# Patient Record
Sex: FEMALE | Race: WHITE | NOT HISPANIC OR LATINO | Employment: UNEMPLOYED | URBAN - METROPOLITAN AREA
[De-identification: names, ages, dates, MRNs, and addresses within clinical notes are randomized per-mention and may not be internally consistent; named-entity substitution may affect disease eponyms.]

---

## 2024-01-23 RX ORDER — ASPIRIN 325 MG
50000 TABLET, DELAYED RELEASE (ENTERIC COATED) ORAL
COMMUNITY

## 2024-01-23 RX ORDER — MULTIVITAMIN
1 TABLET ORAL DAILY
COMMUNITY

## 2024-01-23 RX ORDER — LISINOPRIL AND HYDROCHLOROTHIAZIDE 10; 12.5 MG/1; MG/1
1 TABLET ORAL DAILY
COMMUNITY
End: 2024-01-26 | Stop reason: SDUPTHER

## 2024-01-26 ENCOUNTER — OFFICE VISIT (OUTPATIENT)
Dept: PRIMARY CARE | Facility: CLINIC | Age: 64
End: 2024-01-26
Payer: COMMERCIAL

## 2024-01-26 VITALS
DIASTOLIC BLOOD PRESSURE: 81 MMHG | SYSTOLIC BLOOD PRESSURE: 119 MMHG | HEIGHT: 63 IN | HEART RATE: 56 BPM | BODY MASS INDEX: 29.48 KG/M2 | TEMPERATURE: 97.9 F | WEIGHT: 166.4 LBS

## 2024-01-26 DIAGNOSIS — Z12.31 ENCOUNTER FOR SCREENING MAMMOGRAM FOR MALIGNANT NEOPLASM OF BREAST: ICD-10-CM

## 2024-01-26 DIAGNOSIS — F51.05 INSOMNIA SECONDARY TO ANXIETY: ICD-10-CM

## 2024-01-26 DIAGNOSIS — I10 ESSENTIAL HYPERTENSION: Primary | ICD-10-CM

## 2024-01-26 DIAGNOSIS — F41.9 INSOMNIA SECONDARY TO ANXIETY: ICD-10-CM

## 2024-01-26 PROBLEM — J30.9 ALLERGIC RHINITIS: Status: ACTIVE | Noted: 2024-01-26

## 2024-01-26 PROBLEM — I83.90 VARICOSE VEIN OF LEG: Status: ACTIVE | Noted: 2024-01-26

## 2024-01-26 PROBLEM — E78.1 HIGH TRIGLYCERIDES: Status: ACTIVE | Noted: 2024-01-26

## 2024-01-26 PROBLEM — E55.9 VITAMIN D DEFICIENCY: Status: ACTIVE | Noted: 2024-01-26

## 2024-01-26 PROCEDURE — 3079F DIAST BP 80-89 MM HG: CPT | Performed by: FAMILY MEDICINE

## 2024-01-26 PROCEDURE — 3074F SYST BP LT 130 MM HG: CPT | Performed by: FAMILY MEDICINE

## 2024-01-26 PROCEDURE — 99213 OFFICE O/P EST LOW 20 MIN: CPT | Performed by: FAMILY MEDICINE

## 2024-01-26 PROCEDURE — 1036F TOBACCO NON-USER: CPT | Performed by: FAMILY MEDICINE

## 2024-01-26 RX ORDER — HYDROXYZINE HYDROCHLORIDE 10 MG/1
10-20 TABLET, FILM COATED ORAL NIGHTLY PRN
Qty: 15 TABLET | Refills: 0 | Status: SHIPPED | OUTPATIENT
Start: 2024-01-26 | End: 2024-04-30

## 2024-01-26 RX ORDER — LISINOPRIL AND HYDROCHLOROTHIAZIDE 10; 12.5 MG/1; MG/1
1 TABLET ORAL DAILY
Qty: 90 TABLET | Refills: 1 | Status: SHIPPED | OUTPATIENT
Start: 2024-01-26

## 2024-01-26 ASSESSMENT — PATIENT HEALTH QUESTIONNAIRE - PHQ9
SUM OF ALL RESPONSES TO PHQ9 QUESTIONS 1 AND 2: 0
2. FEELING DOWN, DEPRESSED OR HOPELESS: NOT AT ALL
1. LITTLE INTEREST OR PLEASURE IN DOING THINGS: NOT AT ALL

## 2024-01-26 NOTE — PROGRESS NOTES
"Subjective   Patient ID: Yenni Toussaint is a 63 y.o. female who presents for Follow-up (6 month follow up).    HPI   Insomnia-problems for past week. 30-60 min+ for sleep onset.  Waking up 1-2 times.  Occasional difficulty with returning to sleep. Increased stressors  at home and work.  Htn-sbp<120,dbp<80  Review of Systems    Objective   /81 (BP Location: Left arm, Patient Position: Sitting, BP Cuff Size: Large adult)   Pulse 56   Temp 36.6 °C (97.9 °F)   Ht 1.6 m (5' 3\")   Wt 75.5 kg (166 lb 6.4 oz)   BMI 29.48 kg/m²     Physical Exam  Constitutional:       General: She is not in acute distress.     Appearance: Normal appearance.   HENT:      Head: Normocephalic and atraumatic.      Right Ear: Tympanic membrane and ear canal normal.      Left Ear: Tympanic membrane and ear canal normal.      Nose: Nose normal. No congestion or rhinorrhea.      Mouth/Throat:      Mouth: Mucous membranes are moist.      Pharynx: Oropharynx is clear.   Eyes:      Extraocular Movements: Extraocular movements intact.      Conjunctiva/sclera: Conjunctivae normal.      Pupils: Pupils are equal, round, and reactive to light.   Cardiovascular:      Rate and Rhythm: Normal rate and regular rhythm.      Pulses: Normal pulses.      Heart sounds: Normal heart sounds. No murmur heard.  Pulmonary:      Effort: Pulmonary effort is normal. No respiratory distress.      Breath sounds: Normal breath sounds.   Chest:   Breasts:     Right: Normal. No mass or skin change.      Left: Normal. No mass or skin change.   Abdominal:      General: Abdomen is flat. Bowel sounds are normal.      Palpations: Abdomen is soft.      Tenderness: There is no abdominal tenderness.   Musculoskeletal:         General: Normal range of motion.      Cervical back: Normal range of motion and neck supple.   Lymphadenopathy:      Cervical: No cervical adenopathy.      Upper Body:      Right upper body: No axillary adenopathy.      Left upper body: No axillary " adenopathy.   Skin:     General: Skin is warm and dry.   Neurological:      General: No focal deficit present.      Mental Status: She is alert and oriented to person, place, and time.   Psychiatric:         Mood and Affect: Mood normal.         Thought Content: Thought content normal.         Assessment/Plan   Diagnoses and all orders for this visit:  Essential hypertension  -     lisinopriL-hydrochlorothiazide 10-12.5 mg tablet; Take 1 tablet by mouth once daily.  Encounter for screening mammogram for malignant neoplasm of breast  -     BI mammo bilateral screening tomosynthesis; Future  Insomnia secondary to anxiety  Trial of low-dose hydroxyzine at bedtime to see if this helps with sleep onset and reduction of anxiety at bedtime.  -     hydrOXYzine HCL (Atarax) 10 mg tablet; Take 1-2 tablets (10-20 mg) by mouth as needed at bedtime for itching.

## 2024-02-16 ENCOUNTER — TELEPHONE (OUTPATIENT)
Dept: PRIMARY CARE | Facility: CLINIC | Age: 64
End: 2024-02-16
Payer: COMMERCIAL

## 2024-02-16 DIAGNOSIS — R00.1 BRADYCARDIA: Primary | ICD-10-CM

## 2024-02-16 NOTE — TELEPHONE ENCOUNTER
Patient LVM that she was seen end of last month and her HR was low.  She has been monitoring it.  She states that it stays around 50.  She says that it does increase when she exerts herself but then goes back down.  Please advise.

## 2024-02-16 NOTE — TELEPHONE ENCOUNTER
Recommend 24-hour Holter monitor for better evaluation of heart rate throughout 1 day.  Order in chart.

## 2024-02-23 ENCOUNTER — ANCILLARY PROCEDURE (OUTPATIENT)
Dept: CARDIOLOGY | Facility: CLINIC | Age: 64
End: 2024-02-23
Payer: COMMERCIAL

## 2024-02-23 DIAGNOSIS — R00.1 BRADYCARDIA: ICD-10-CM

## 2024-02-23 PROCEDURE — 93227 XTRNL ECG REC<48 HR R&I: CPT | Performed by: INTERNAL MEDICINE

## 2024-02-23 PROCEDURE — 93225 XTRNL ECG REC<48 HRS REC: CPT | Performed by: INTERNAL MEDICINE

## 2024-03-12 ENCOUNTER — TELEPHONE (OUTPATIENT)
Dept: CARDIOLOGY | Facility: CLINIC | Age: 64
End: 2024-03-12
Payer: COMMERCIAL

## 2024-04-30 ENCOUNTER — OFFICE VISIT (OUTPATIENT)
Dept: PRIMARY CARE | Facility: CLINIC | Age: 64
End: 2024-04-30
Payer: COMMERCIAL

## 2024-04-30 VITALS
SYSTOLIC BLOOD PRESSURE: 137 MMHG | TEMPERATURE: 98.1 F | WEIGHT: 167 LBS | DIASTOLIC BLOOD PRESSURE: 88 MMHG | HEART RATE: 57 BPM | HEIGHT: 63 IN | BODY MASS INDEX: 29.59 KG/M2

## 2024-04-30 DIAGNOSIS — K59.00 CONSTIPATION, UNSPECIFIED CONSTIPATION TYPE: ICD-10-CM

## 2024-04-30 DIAGNOSIS — E66.3 OVERWEIGHT WITH BODY MASS INDEX (BMI) OF 29 TO 29.9 IN ADULT: ICD-10-CM

## 2024-04-30 DIAGNOSIS — E78.1 HIGH TRIGLYCERIDES: ICD-10-CM

## 2024-04-30 DIAGNOSIS — I10 ESSENTIAL HYPERTENSION: Primary | ICD-10-CM

## 2024-04-30 PROCEDURE — 3079F DIAST BP 80-89 MM HG: CPT | Performed by: INTERNAL MEDICINE

## 2024-04-30 PROCEDURE — 1036F TOBACCO NON-USER: CPT | Performed by: INTERNAL MEDICINE

## 2024-04-30 PROCEDURE — 99215 OFFICE O/P EST HI 40 MIN: CPT | Performed by: INTERNAL MEDICINE

## 2024-04-30 PROCEDURE — 3075F SYST BP GE 130 - 139MM HG: CPT | Performed by: INTERNAL MEDICINE

## 2024-04-30 PROCEDURE — 3008F BODY MASS INDEX DOCD: CPT | Performed by: INTERNAL MEDICINE

## 2024-04-30 ASSESSMENT — PATIENT HEALTH QUESTIONNAIRE - PHQ9
SUM OF ALL RESPONSES TO PHQ9 QUESTIONS 1 AND 2: 0
1. LITTLE INTEREST OR PLEASURE IN DOING THINGS: NOT AT ALL
2. FEELING DOWN, DEPRESSED OR HOPELESS: NOT AT ALL

## 2024-04-30 NOTE — PROGRESS NOTES
Subjective   Patient ID: Yenni Toussaint is a 64 y.o. female who presents for Constipation (Patient c/o constipation for the last 2 weeks. Patient states she took a laxative a week and a half ago. ).    HPI Pt is a pleasant 64 y.o. CF who was seen and evaluated during the OV with 2 weeks hx of constipation. Pt states that her BM usually is on the daily basis. However, for the last 2 weeks she feels like she did not empty her bowel completely, Pt had to use OTC laxative such as dulcolax and had a large BW earlier today. During the OV pt denies fever, chills, no change of appetite, no abdominal pian. No N/V/D, no blood in stool. Pt had the screening colonoscopy back to 2017 and was asked to repeat a colonoscopy in 10 years. During the clinical encounter pt denies SOB, no chest pain/tightness, no change of urination reported. Pt denies any other health concerns during this visit.  Sohx: reviewed  List of the medications and allergies: reviewed  PMHx: reviewed  Fhx: no hx of the GI CA    Review of Systems  The systems have been reviewed as follows:   Constitutional: no fever, no chills  Eyes: no eyesight problems, no eye redness, no eye pain, no blurred vision  ENT: no ear pain or sore throat, no nasal discharge or congestion, no sinus pressure, no hearing loss  Cardiovascular: no chest pain or tightness, no SOB, the heart rate was not fast or slow  Respiratory: no cough, no dyspnea with exertion or with rest, no wheezing  Gastrointestinal: as mentioned at HPI  Genitourinary: no urine frequency, no dysuria, no urinary urgency, no urinary incontinence or retention  Musculoskeletal: no back pain, no arthralgia, no joint swelling or stiffness, no muscle weakness  Integumentary: no skin lesions or rash  Neurological: no headaches, no dizziness or fainting, no limb weakness  Psychiatric: no mood changes, no anxiety or depression, no SI/HI  Endocrine: no weight change, no temperature intolerance, no   change of  "appetite  Hematologic/Lymphatic: no easy bruising or bleeding  Objective   /88 (BP Location: Left arm, Patient Position: Sitting)   Pulse 57   Temp 36.7 °C (98.1 °F)   Ht 1.6 m (5' 3\")   Wt 75.8 kg (167 lb)   BMI 29.58 kg/m²     Physical Exam  Constitutional: well hydrated, well nourished, no acute distress. Vital signs reviewed  Head and face: normocephalic, atraumatic  Eyes: no erythema, edema or visible abnormalities of conjunctiva or lids. Pupils are equal, round and reactive to light. Extraocular movement normal  ENT: external ears without deformities  Neck: full ROM, no adenopathy, neck was supple, thyroid gland not enlarged  Pulmonary: normal respiratory rate and effort. Lungs are clear to auscultation, no rales,no rhonchi or wheezing  Cardiovascular: RRR, normal S1 and S2, no murmur, no gallop, posterior tib. pulse normal 2+ bilaterally, no lower extremity edema  Abdomen: soft, non tender on palpation, not distended, normal BS in all 4 quadrants, no CVA tenderness  Musculoskeletal: no joint swelling, normal movements of all 4 extremities. Gait and station: normal, Digits and nails: normal without clubbing  Skin: normal color, no rash  Neurologic: Cranial nerves: CN II: visual acuity intact. Source: acuity reported by patient. Pupils reactive to light with normal accommodation. Right and left pupil normal CN III, IV and VI: the EO movements were intact. CN VIII: no hearing loss. Sensory exam: normal to light touch  Psychiatric: Mood and affect: normal, Alert and Oriented x 3  Lymphatic: no cervical lymphadenopathy  Assessment/Plan   Constipation:  Hx and PE as mentioned  I offered the BW test as well as the evaluation with the imaging test to the pt, but pt declined it. Pt would like to try the dietary changes 1st and if she will have any persistent sxs/worsening of the sxs she will contact to us  Pt was educated about the high fiber diet and printed education material was given by the end of the " OV directly to the pt.  HTN: continue on lisinopril/hydrochlorothiazide 10-12.5 mg po  HLD: pt use the dietary approach to lower the cholesterol level   BMI of 29: continue the aggressive lifestyle modifications  I discussed every sxs with the pt in detail. Pt verbalized understanding of the health  condition and agreed with the clinical approach as discussed as mentioned above  Please FU with PCP as planned or sooner if needed.

## 2024-07-08 ENCOUNTER — HOSPITAL ENCOUNTER (OUTPATIENT)
Dept: RADIOLOGY | Facility: CLINIC | Age: 64
Discharge: HOME | End: 2024-07-08
Payer: COMMERCIAL

## 2024-07-08 VITALS — WEIGHT: 160 LBS | HEIGHT: 63 IN | BODY MASS INDEX: 28.35 KG/M2

## 2024-07-08 DIAGNOSIS — Z12.31 ENCOUNTER FOR SCREENING MAMMOGRAM FOR MALIGNANT NEOPLASM OF BREAST: ICD-10-CM

## 2024-07-08 PROCEDURE — 77067 SCR MAMMO BI INCL CAD: CPT | Performed by: RADIOLOGY

## 2024-07-08 PROCEDURE — 77067 SCR MAMMO BI INCL CAD: CPT

## 2024-07-08 PROCEDURE — 77063 BREAST TOMOSYNTHESIS BI: CPT | Performed by: RADIOLOGY

## 2024-07-26 ENCOUNTER — APPOINTMENT (OUTPATIENT)
Dept: PRIMARY CARE | Facility: CLINIC | Age: 64
End: 2024-07-26
Payer: COMMERCIAL

## 2024-07-26 VITALS
HEART RATE: 73 BPM | DIASTOLIC BLOOD PRESSURE: 82 MMHG | TEMPERATURE: 97.7 F | BODY MASS INDEX: 29.77 KG/M2 | SYSTOLIC BLOOD PRESSURE: 136 MMHG | HEIGHT: 63 IN | WEIGHT: 168 LBS

## 2024-07-26 DIAGNOSIS — I10 ESSENTIAL HYPERTENSION: Primary | ICD-10-CM

## 2024-07-26 DIAGNOSIS — K59.00 CONSTIPATION, UNSPECIFIED CONSTIPATION TYPE: ICD-10-CM

## 2024-07-26 DIAGNOSIS — F43.21 SITUATIONAL DEPRESSION: ICD-10-CM

## 2024-07-26 PROCEDURE — 3079F DIAST BP 80-89 MM HG: CPT | Performed by: FAMILY MEDICINE

## 2024-07-26 PROCEDURE — 99214 OFFICE O/P EST MOD 30 MIN: CPT | Performed by: FAMILY MEDICINE

## 2024-07-26 PROCEDURE — 3008F BODY MASS INDEX DOCD: CPT | Performed by: FAMILY MEDICINE

## 2024-07-26 PROCEDURE — 3075F SYST BP GE 130 - 139MM HG: CPT | Performed by: FAMILY MEDICINE

## 2024-07-26 RX ORDER — LISINOPRIL AND HYDROCHLOROTHIAZIDE 10; 12.5 MG/1; MG/1
1 TABLET ORAL DAILY
Qty: 90 TABLET | Refills: 1 | Status: SHIPPED | OUTPATIENT
Start: 2024-07-26

## 2024-07-26 ASSESSMENT — PATIENT HEALTH QUESTIONNAIRE - PHQ9
2. FEELING DOWN, DEPRESSED OR HOPELESS: NOT AT ALL
5. POOR APPETITE OR OVEREATING: SEVERAL DAYS
4. FEELING TIRED OR HAVING LITTLE ENERGY: SEVERAL DAYS
7. TROUBLE CONCENTRATING ON THINGS, SUCH AS READING THE NEWSPAPER OR WATCHING TELEVISION: NOT AT ALL
1. LITTLE INTEREST OR PLEASURE IN DOING THINGS: SEVERAL DAYS
3. TROUBLE FALLING OR STAYING ASLEEP OR SLEEPING TOO MUCH: NOT AT ALL
SUM OF ALL RESPONSES TO PHQ9 QUESTIONS 1 AND 2: 0
1. LITTLE INTEREST OR PLEASURE IN DOING THINGS: NOT AT ALL
9. THOUGHTS THAT YOU WOULD BE BETTER OFF DEAD, OR OF HURTING YOURSELF: NOT AT ALL
SUM OF ALL RESPONSES TO PHQ QUESTIONS 1-9: 5
2. FEELING DOWN, DEPRESSED OR HOPELESS: MORE THAN HALF THE DAYS
SUM OF ALL RESPONSES TO PHQ9 QUESTIONS 1 AND 2: 3
10. IF YOU CHECKED OFF ANY PROBLEMS, HOW DIFFICULT HAVE THESE PROBLEMS MADE IT FOR YOU TO DO YOUR WORK, TAKE CARE OF THINGS AT HOME, OR GET ALONG WITH OTHER PEOPLE: SOMEWHAT DIFFICULT
8. MOVING OR SPEAKING SO SLOWLY THAT OTHER PEOPLE COULD HAVE NOTICED. OR THE OPPOSITE, BEING SO FIGETY OR RESTLESS THAT YOU HAVE BEEN MOVING AROUND A LOT MORE THAN USUAL: NOT AT ALL
6. FEELING BAD ABOUT YOURSELF - OR THAT YOU ARE A FAILURE OR HAVE LET YOURSELF OR YOUR FAMILY DOWN: NOT AT ALL

## 2024-07-26 ASSESSMENT — COLUMBIA-SUICIDE SEVERITY RATING SCALE - C-SSRS
1. IN THE PAST MONTH, HAVE YOU WISHED YOU WERE DEAD OR WISHED YOU COULD GO TO SLEEP AND NOT WAKE UP?: NO
6. HAVE YOU EVER DONE ANYTHING, STARTED TO DO ANYTHING, OR PREPARED TO DO ANYTHING TO END YOUR LIFE?: NO
2. HAVE YOU ACTUALLY HAD ANY THOUGHTS OF KILLING YOURSELF?: NO

## 2024-07-26 NOTE — PROGRESS NOTES
"Subjective   Patient ID: Yenni Toussaint is a 64 y.o. female who presents for Follow-up and Med Refill (lisinopril).    HPI   Feeling down-continuing to have marital struggles, feels that this negatively impacts her mood.   has chronic COPD but continues to smoke.  She finds it challenging to cope with his lack of motivation to make changes to improve his overall health.  Feels that she does have some good support with friends, relies on her debra.  Denies SI or HI.  No significant anxiety  HTN-checks BP intermittently at home.  Reading earlier this week 107/70.  Tolerating lisinopril with hydrochlorothiazide without adverse effect.  Review of Systems   Constitutional:  Negative for fatigue and unexpected weight change.   Respiratory:  Negative for chest tightness and shortness of breath.    Cardiovascular:  Negative for chest pain, palpitations and leg swelling.   Gastrointestinal:  Positive for constipation. Negative for abdominal pain, blood in stool and diarrhea.   Neurological:  Negative for dizziness, light-headedness and headaches.   Psychiatric/Behavioral:  Positive for dysphoric mood. Negative for sleep disturbance and suicidal ideas.        Objective   BP (!) 136/91 (BP Location: Left arm, Patient Position: Sitting, BP Cuff Size: Adult)   Pulse 73   Temp 36.5 °C (97.7 °F)   Ht 1.6 m (5' 3\")   Wt 76.2 kg (168 lb)   BMI 29.76 kg/m²           Assessment/Plan   Diagnoses and all orders for this visit:  Essential hypertension  BP control adequate but not optimal.  Recommend she check at home and confirm measurements are 120/80 or less  Proceed with lab work as ordered  -     lisinopriL-hydrochlorothiazide 10-12.5 mg tablet; Take 1 tablet by mouth once daily.  -     Albumin-Creatinine Ratio, Urine Random; Future  -     Lipid Panel; Future  -     Comprehensive Metabolic Panel; Future  -     CBC and Auto Differential; Future  -     TSH with reflex to Free T4 if abnormal; Future  Constipation, " unspecified constipation type  Discussed the impact of stress on bowel habits.  Encourage adequate fluids and fiber intake.  Will check TSH with next lab work  -     TSH with reflex to Free T4 if abnormal; Future  Situational depression  Discussed interventions including but not limited to counseling, medications.  At present she wishes to seek counseling through her  but will reach out if she wishes to have referral or discussed medication.  Other orders  -     Follow Up In Primary Care - Established; Future

## 2024-07-27 ASSESSMENT — ENCOUNTER SYMPTOMS
ABDOMINAL PAIN: 0
DIARRHEA: 0
PALPITATIONS: 0
BLOOD IN STOOL: 0
HEADACHES: 0
DYSPHORIC MOOD: 1
CONSTIPATION: 1
SLEEP DISTURBANCE: 0
SHORTNESS OF BREATH: 0
DIZZINESS: 0
CHEST TIGHTNESS: 0
FATIGUE: 0
UNEXPECTED WEIGHT CHANGE: 0
LIGHT-HEADEDNESS: 0

## 2024-08-14 ENCOUNTER — TELEPHONE (OUTPATIENT)
Dept: PRIMARY CARE | Facility: CLINIC | Age: 64
End: 2024-08-14
Payer: COMMERCIAL

## 2024-08-14 DIAGNOSIS — D72.818 OTHER DECREASED WHITE BLOOD CELL (WBC) COUNT: Primary | ICD-10-CM

## 2024-08-14 NOTE — TELEPHONE ENCOUNTER
Reviewed recent lab work.  LDL cholesterol increased slightly, but still acceptable.  Continue efforts at healthy food choices, regular exercise.  Kidney function and electrolytes normal.  White blood cell count low at 3.3.  This can sometimes occur after acute viral illness.  Has she been sick recently?

## 2024-08-19 NOTE — TELEPHONE ENCOUNTER
Patient aware and verbalized understanding  She states that she was feeling ill at the time of blood draw.

## 2024-11-27 ENCOUNTER — TELEPHONE (OUTPATIENT)
Dept: PRIMARY CARE | Facility: CLINIC | Age: 64
End: 2024-11-27
Payer: COMMERCIAL

## 2024-11-27 NOTE — TELEPHONE ENCOUNTER
----- Message from Gisell Lee sent at 11/27/2024  1:21 PM EST -----  Repeat blood count shows white blood cell count improved and neutrophils back to normal range.

## 2024-12-05 ENCOUNTER — OFFICE VISIT (OUTPATIENT)
Dept: PRIMARY CARE | Facility: CLINIC | Age: 64
End: 2024-12-05
Payer: COMMERCIAL

## 2024-12-05 VITALS
HEART RATE: 57 BPM | DIASTOLIC BLOOD PRESSURE: 86 MMHG | SYSTOLIC BLOOD PRESSURE: 138 MMHG | WEIGHT: 170 LBS | OXYGEN SATURATION: 98 % | BODY MASS INDEX: 30.11 KG/M2

## 2024-12-05 DIAGNOSIS — R10.9 ABDOMINAL PAIN, UNSPECIFIED ABDOMINAL LOCATION: ICD-10-CM

## 2024-12-05 DIAGNOSIS — K59.00 CONSTIPATION, UNSPECIFIED CONSTIPATION TYPE: Primary | ICD-10-CM

## 2024-12-05 PROCEDURE — 99213 OFFICE O/P EST LOW 20 MIN: CPT | Performed by: FAMILY MEDICINE

## 2024-12-05 PROCEDURE — 3079F DIAST BP 80-89 MM HG: CPT | Performed by: FAMILY MEDICINE

## 2024-12-05 PROCEDURE — 3075F SYST BP GE 130 - 139MM HG: CPT | Performed by: FAMILY MEDICINE

## 2024-12-05 PROCEDURE — 1036F TOBACCO NON-USER: CPT | Performed by: FAMILY MEDICINE

## 2024-12-05 RX ORDER — LACTOBACILLUS RHAMNOSUS GG 10B CELL
1 CAPSULE ORAL DAILY
Qty: 30 CAPSULE | Refills: 0 | Status: SHIPPED | OUTPATIENT
Start: 2024-12-05

## 2024-12-05 RX ORDER — POLYETHYLENE GLYCOL 3350 17 G/17G
17 POWDER, FOR SOLUTION ORAL DAILY PRN
Qty: 510 G | Refills: 1 | Status: SHIPPED | OUTPATIENT
Start: 2024-12-05

## 2024-12-05 RX ORDER — POLYETHYLENE GLYCOL 3350 17 G/17G
POWDER, FOR SOLUTION ORAL
Qty: 238 G | Refills: 0 | Status: SHIPPED | OUTPATIENT
Start: 2024-12-05

## 2024-12-05 ASSESSMENT — ENCOUNTER SYMPTOMS
NAUSEA: 0
CHILLS: 0
SHORTNESS OF BREATH: 0
VOMITING: 0
DIARRHEA: 0
BLOOD IN STOOL: 0
UNEXPECTED WEIGHT CHANGE: 0
ABDOMINAL PAIN: 1
CHEST TIGHTNESS: 0
RECTAL PAIN: 0
CONSTIPATION: 1

## 2024-12-05 ASSESSMENT — PAIN SCALES - GENERAL: PAINLEVEL_OUTOF10: 0-NO PAIN

## 2024-12-05 ASSESSMENT — PATIENT HEALTH QUESTIONNAIRE - PHQ9
1. LITTLE INTEREST OR PLEASURE IN DOING THINGS: NOT AT ALL
SUM OF ALL RESPONSES TO PHQ9 QUESTIONS 1 AND 2: 0
2. FEELING DOWN, DEPRESSED OR HOPELESS: NOT AT ALL

## 2024-12-05 NOTE — PROGRESS NOTES
Subjective   Patient ID: Yenni Toussaint is a 64 y.o. female who presents for Follow-up (Constipation ).    HPI   Constipation x 6months  BM q3 days, small amt of stool, durand snot feel like fully emptying.  Has tried to manage symptoms by taking prune juice, Increase fiber, fluid intake, metamucil.  No significant improvement.  Potential triggers include increased stress.  No significant diet changes prior to onset of symptoms.  No blood in stool.  Occasional crampy abdominal pain.    HTN-120s/70-80 on home measurement.  Tolerating current lisinopril-HCTZ without side effect.  Review of Systems   Constitutional:  Negative for chills and unexpected weight change.   Respiratory:  Negative for chest tightness and shortness of breath.    Cardiovascular:  Negative for chest pain.   Gastrointestinal:  Positive for abdominal pain and constipation. Negative for blood in stool, diarrhea, nausea, rectal pain and vomiting.       Objective   /81 (BP Location: Left arm, Patient Position: Sitting) Comment: heart level  Pulse 57   Wt 77.1 kg (170 lb)   SpO2 98%   BMI 30.11 kg/m²     Physical Exam  Vitals reviewed.   Constitutional:       General: She is not in acute distress.     Appearance: Normal appearance.   HENT:      Head: Normocephalic and atraumatic.      Right Ear: Tympanic membrane and ear canal normal.      Left Ear: Tympanic membrane and ear canal normal.      Nose: Nose normal. No congestion or rhinorrhea.      Mouth/Throat:      Mouth: Mucous membranes are moist.      Pharynx: Oropharynx is clear.   Eyes:      Extraocular Movements: Extraocular movements intact.      Conjunctiva/sclera: Conjunctivae normal.      Pupils: Pupils are equal, round, and reactive to light.   Cardiovascular:      Rate and Rhythm: Normal rate and regular rhythm.      Pulses: Normal pulses.      Heart sounds: Normal heart sounds. No murmur heard.  Pulmonary:      Effort: Pulmonary effort is normal. No respiratory distress.       Breath sounds: Normal breath sounds.   Abdominal:      General: Abdomen is flat. Bowel sounds are normal. There is no distension.      Palpations: Abdomen is soft.      Tenderness: There is no abdominal tenderness. There is no guarding or rebound.   Musculoskeletal:         General: Normal range of motion.      Cervical back: Normal range of motion and neck supple.      Right lower leg: No edema.      Left lower leg: No edema.   Lymphadenopathy:      Cervical: No cervical adenopathy.   Skin:     General: Skin is warm and dry.   Neurological:      General: No focal deficit present.      Mental Status: She is alert and oriented to person, place, and time.   Psychiatric:         Mood and Affect: Mood normal.         Thought Content: Thought content normal.         Assessment/Plan   Assessment & Plan  Constipation, unspecified constipation type  Check abdominal x-ray  If no evidence for obstruction, bowel cleanse with MiraLAX followed by daily dose of MiraLAX with titration to achieve regular bowel movements  After bowel cleanse start probiotic  If symptoms do not improve, we discussed potential GI referral  Orders:    XR abdomen 2 views supine and erect or decub; Future    polyethylene glycol (Glycolax, Miralax) 17 gram/dose powder; Mix 17 g of powder and drink once daily as needed for constipation.    polyethylene glycol (Glycolax, Miralax) 17 gram/dose powder; Mix of powder and drink. Mix contents of bottle in 64 oz liquid (gatorade or similar) and consume 8 oz every 20 min until complete.    lactobacillus (Culturelle) 10 billion cell capsule; Take 1 capsule by mouth once daily.    Abdominal pain, unspecified abdominal location    Orders:    XR abdomen 2 views supine and erect or decub; Future

## 2024-12-06 NOTE — ASSESSMENT & PLAN NOTE
Check abdominal x-ray  If no evidence for obstruction, bowel cleanse with MiraLAX followed by daily dose of MiraLAX with titration to achieve regular bowel movements  After bowel cleanse start probiotic  If symptoms do not improve, we discussed potential GI referral  Orders:    XR abdomen 2 views supine and erect or decub; Future    polyethylene glycol (Glycolax, Miralax) 17 gram/dose powder; Mix 17 g of powder and drink once daily as needed for constipation.    polyethylene glycol (Glycolax, Miralax) 17 gram/dose powder; Mix of powder and drink. Mix contents of bottle in 64 oz liquid (gatorade or similar) and consume 8 oz every 20 min until complete.    lactobacillus (Culturelle) 10 billion cell capsule; Take 1 capsule by mouth once daily.

## 2024-12-09 ENCOUNTER — HOSPITAL ENCOUNTER (OUTPATIENT)
Dept: RADIOLOGY | Facility: CLINIC | Age: 64
Discharge: HOME | End: 2024-12-09
Payer: COMMERCIAL

## 2024-12-09 DIAGNOSIS — K59.00 CONSTIPATION, UNSPECIFIED CONSTIPATION TYPE: ICD-10-CM

## 2024-12-09 DIAGNOSIS — R10.9 ABDOMINAL PAIN, UNSPECIFIED ABDOMINAL LOCATION: ICD-10-CM

## 2024-12-09 PROCEDURE — 74019 RADEX ABDOMEN 2 VIEWS: CPT

## 2024-12-09 PROCEDURE — 74019 RADEX ABDOMEN 2 VIEWS: CPT | Performed by: RADIOLOGY

## 2024-12-16 ENCOUNTER — TELEPHONE (OUTPATIENT)
Dept: PRIMARY CARE | Facility: CLINIC | Age: 64
End: 2024-12-16
Payer: COMMERCIAL

## 2024-12-16 NOTE — TELEPHONE ENCOUNTER
No evidence of obstruction on x-ray of abdomen.  Significant amount of stool throughout the colon.  Proceed with MiraLAX bowel cleanse as discussed at office visit.  Call if any concerns

## 2024-12-16 NOTE — TELEPHONE ENCOUNTER
Pt called in to office for results.  Pt was notified and had a few concerns.  Would like to know if she would have to do another colon cleanse since there was still stool in her colon.  Stated that she had done the cleanse on a Friday and taken the x-ray on Monday and wanted to know if it was normal to have all of that extra stool still.  She was also able to go to the bathroom everyday after doing the cleanse.  Please advise what pt should do moving forward.

## 2025-01-27 ENCOUNTER — APPOINTMENT (OUTPATIENT)
Dept: PRIMARY CARE | Facility: CLINIC | Age: 65
End: 2025-01-27
Payer: COMMERCIAL

## 2025-01-27 DIAGNOSIS — K59.00 CONSTIPATION, UNSPECIFIED CONSTIPATION TYPE: ICD-10-CM

## 2025-01-27 DIAGNOSIS — L85.3 DRY SKIN DERMATITIS: ICD-10-CM

## 2025-01-27 DIAGNOSIS — I10 ESSENTIAL HYPERTENSION: ICD-10-CM

## 2025-01-27 DIAGNOSIS — E55.9 VITAMIN D DEFICIENCY: ICD-10-CM

## 2025-01-27 DIAGNOSIS — Z23 NEED FOR INFLUENZA VACCINATION: Primary | ICD-10-CM

## 2025-01-27 PROCEDURE — 90677 PCV20 VACCINE IM: CPT | Performed by: FAMILY MEDICINE

## 2025-01-27 PROCEDURE — 1036F TOBACCO NON-USER: CPT | Performed by: FAMILY MEDICINE

## 2025-01-27 PROCEDURE — 90656 IIV3 VACC NO PRSV 0.5 ML IM: CPT | Performed by: FAMILY MEDICINE

## 2025-01-27 PROCEDURE — 3078F DIAST BP <80 MM HG: CPT | Performed by: FAMILY MEDICINE

## 2025-01-27 PROCEDURE — 90472 IMMUNIZATION ADMIN EACH ADD: CPT | Performed by: FAMILY MEDICINE

## 2025-01-27 PROCEDURE — 99214 OFFICE O/P EST MOD 30 MIN: CPT | Performed by: FAMILY MEDICINE

## 2025-01-27 PROCEDURE — 3075F SYST BP GE 130 - 139MM HG: CPT | Performed by: FAMILY MEDICINE

## 2025-01-27 PROCEDURE — 3008F BODY MASS INDEX DOCD: CPT | Performed by: FAMILY MEDICINE

## 2025-01-27 PROCEDURE — 90471 IMMUNIZATION ADMIN: CPT | Performed by: FAMILY MEDICINE

## 2025-01-27 RX ORDER — TRIAMCINOLONE ACETONIDE 1 MG/G
CREAM TOPICAL 2 TIMES DAILY PRN
Qty: 80 G | Refills: 0 | Status: SHIPPED | OUTPATIENT
Start: 2025-01-27

## 2025-01-27 RX ORDER — LISINOPRIL AND HYDROCHLOROTHIAZIDE 10; 12.5 MG/1; MG/1
1 TABLET ORAL DAILY
Qty: 90 TABLET | Refills: 1 | Status: SHIPPED | OUTPATIENT
Start: 2025-01-27

## 2025-01-28 VITALS
HEART RATE: 55 BPM | HEIGHT: 63 IN | DIASTOLIC BLOOD PRESSURE: 72 MMHG | TEMPERATURE: 97.2 F | WEIGHT: 171 LBS | BODY MASS INDEX: 30.3 KG/M2 | SYSTOLIC BLOOD PRESSURE: 134 MMHG

## 2025-01-28 NOTE — ASSESSMENT & PLAN NOTE
Continue lisinopril-HCTZ at current dose.  Labs as ordered prior to follow-up visit.  Continue home monitoring.  If frequent readings greater than 130/80, discussed possible medication dose adjustment  Orders:    lisinopriL-hydrochlorothiazide 10-12.5 mg tablet; Take 1 tablet by mouth once daily.    Albumin-Creatinine Ratio, Urine Random; Future    Lipid Panel; Future    Comprehensive Metabolic Panel; Future    CBC and Auto Differential; Future

## 2025-06-27 DIAGNOSIS — I10 ESSENTIAL HYPERTENSION: ICD-10-CM

## 2025-07-01 LAB
ALBUMIN SERPL-MCNC: 4.3 G/DL (ref 3.6–5.1)
ALBUMIN/CREAT UR: 7 MG/G CREAT
ALP SERPL-CCNC: 75 U/L (ref 37–153)
ALT SERPL-CCNC: 19 U/L (ref 6–29)
ANION GAP SERPL CALCULATED.4IONS-SCNC: 9 MMOL/L (CALC) (ref 7–17)
AST SERPL-CCNC: 19 U/L (ref 10–35)
BASOPHILS # BLD AUTO: 40 CELLS/UL (ref 0–200)
BASOPHILS NFR BLD AUTO: 1.2 %
BILIRUB SERPL-MCNC: 0.6 MG/DL (ref 0.2–1.2)
BUN SERPL-MCNC: 14 MG/DL (ref 7–25)
CALCIUM SERPL-MCNC: 9.8 MG/DL (ref 8.6–10.4)
CHLORIDE SERPL-SCNC: 101 MMOL/L (ref 98–110)
CHOLEST SERPL-MCNC: 222 MG/DL
CHOLEST/HDLC SERPL: 3.2 (CALC)
CO2 SERPL-SCNC: 30 MMOL/L (ref 20–32)
CREAT SERPL-MCNC: 0.9 MG/DL (ref 0.5–1.05)
CREAT UR-MCNC: 180 MG/DL (ref 20–275)
EGFRCR SERPLBLD CKD-EPI 2021: 72 ML/MIN/1.73M2
EOSINOPHIL # BLD AUTO: 178 CELLS/UL (ref 15–500)
EOSINOPHIL NFR BLD AUTO: 5.4 %
ERYTHROCYTE [DISTWIDTH] IN BLOOD BY AUTOMATED COUNT: 12.9 % (ref 11–15)
GLUCOSE SERPL-MCNC: 85 MG/DL (ref 65–99)
HCT VFR BLD AUTO: 44.3 % (ref 35–45)
HDLC SERPL-MCNC: 70 MG/DL
HGB BLD-MCNC: 14.2 G/DL (ref 11.7–15.5)
LDLC SERPL CALC-MCNC: 127 MG/DL (CALC)
LYMPHOCYTES # BLD AUTO: 1304 CELLS/UL (ref 850–3900)
LYMPHOCYTES NFR BLD AUTO: 39.5 %
MCH RBC QN AUTO: 28.6 PG (ref 27–33)
MCHC RBC AUTO-ENTMCNC: 32.1 G/DL (ref 32–36)
MCV RBC AUTO: 89.3 FL (ref 80–100)
MICROALBUMIN UR-MCNC: 1.2 MG/DL
MONOCYTES # BLD AUTO: 238 CELLS/UL (ref 200–950)
MONOCYTES NFR BLD AUTO: 7.2 %
NEUTROPHILS # BLD AUTO: 1541 CELLS/UL (ref 1500–7800)
NEUTROPHILS NFR BLD AUTO: 46.7 %
NONHDLC SERPL-MCNC: 152 MG/DL (CALC)
PLATELET # BLD AUTO: 238 THOUSAND/UL (ref 140–400)
PMV BLD REES-ECKER: 10.3 FL (ref 7.5–12.5)
POTASSIUM SERPL-SCNC: 4.1 MMOL/L (ref 3.5–5.3)
PROT SERPL-MCNC: 7.1 G/DL (ref 6.1–8.1)
RBC # BLD AUTO: 4.96 MILLION/UL (ref 3.8–5.1)
SODIUM SERPL-SCNC: 140 MMOL/L (ref 135–146)
TRIGL SERPL-MCNC: 135 MG/DL
WBC # BLD AUTO: 3.3 THOUSAND/UL (ref 3.8–10.8)

## 2025-07-14 ENCOUNTER — APPOINTMENT (OUTPATIENT)
Dept: PRIMARY CARE | Facility: CLINIC | Age: 65
End: 2025-07-14
Payer: COMMERCIAL

## 2025-07-14 VITALS
HEART RATE: 52 BPM | TEMPERATURE: 97.9 F | SYSTOLIC BLOOD PRESSURE: 126 MMHG | WEIGHT: 172 LBS | HEIGHT: 63 IN | BODY MASS INDEX: 30.48 KG/M2 | DIASTOLIC BLOOD PRESSURE: 85 MMHG

## 2025-07-14 DIAGNOSIS — Z12.4 CERVICAL CANCER SCREENING: ICD-10-CM

## 2025-07-14 DIAGNOSIS — Z77.22 SECOND HAND TOBACCO SMOKE EXPOSURE: ICD-10-CM

## 2025-07-14 DIAGNOSIS — R06.02 SOB (SHORTNESS OF BREATH): ICD-10-CM

## 2025-07-14 DIAGNOSIS — Z00.00 ADULT GENERAL MEDICAL EXAM: Primary | ICD-10-CM

## 2025-07-14 DIAGNOSIS — L85.3 DRY SKIN DERMATITIS: ICD-10-CM

## 2025-07-14 DIAGNOSIS — Z12.31 SCREENING MAMMOGRAM FOR BREAST CANCER: ICD-10-CM

## 2025-07-14 DIAGNOSIS — Z11.59 SCREENING FOR VIRAL DISEASE: ICD-10-CM

## 2025-07-14 DIAGNOSIS — Z78.0 ASYMPTOMATIC MENOPAUSE: ICD-10-CM

## 2025-07-14 DIAGNOSIS — D72.819 LEUKOPENIA, UNSPECIFIED TYPE: ICD-10-CM

## 2025-07-14 DIAGNOSIS — I10 ESSENTIAL HYPERTENSION: ICD-10-CM

## 2025-07-14 PROCEDURE — 87626 HPV SEP HI-RSK TYP&POOL RSLT: CPT

## 2025-07-14 PROCEDURE — 3079F DIAST BP 80-89 MM HG: CPT | Performed by: FAMILY MEDICINE

## 2025-07-14 PROCEDURE — 88175 CYTOPATH C/V AUTO FLUID REDO: CPT

## 2025-07-14 PROCEDURE — 3074F SYST BP LT 130 MM HG: CPT | Performed by: FAMILY MEDICINE

## 2025-07-14 PROCEDURE — 99213 OFFICE O/P EST LOW 20 MIN: CPT | Performed by: FAMILY MEDICINE

## 2025-07-14 PROCEDURE — 99396 PREV VISIT EST AGE 40-64: CPT | Performed by: FAMILY MEDICINE

## 2025-07-14 PROCEDURE — 3008F BODY MASS INDEX DOCD: CPT | Performed by: FAMILY MEDICINE

## 2025-07-14 RX ORDER — LISINOPRIL AND HYDROCHLOROTHIAZIDE 10; 12.5 MG/1; MG/1
1 TABLET ORAL DAILY
Qty: 90 TABLET | Refills: 1 | Status: SHIPPED | OUTPATIENT
Start: 2025-07-14

## 2025-07-14 RX ORDER — TRIAMCINOLONE ACETONIDE 1 MG/G
CREAM TOPICAL 2 TIMES DAILY PRN
Qty: 80 G | Refills: 0 | Status: SHIPPED | OUTPATIENT
Start: 2025-07-14

## 2025-07-14 NOTE — PROGRESS NOTES
"Subjective   Patient ID: Yenni Toussaint is a 65 y.o. female who presents for Annual Exam (Awv with pap, no concerns. ).    HPI   Nutrition: 2 meals/day; BF-cereal with fruits/oatmeal/breakfast sandwich; dinner-typically prepared at home; protein/starch/veg;  2 cups coffee/day; water  Exercise: treadmill or bike, no strength/weight based training  Sleep: ok onset; wakes 1-2 x/night; ok return to sleep; 7 hr/night  Eye: utd (Feb 2025),  Dental:  q6 months  Colonoscopy due 5/2027  Mammo due  No hx abn pap   (LMP 10 yrs ago)  No vaginal discharge, no vaginal bleeding, no vaginal pain/dryness, no incontinence.  Hypertension stable on current dose of lisinopril-HCTZ.  No headaches, lightheadedness, dizziness.  Endorses occasional shortness of breath, worse recently after URI symptoms.  Sensation of inability to take in full breath, particularly with some physical activity.  Not a smoker, but secondhand smoke exposure at home.    Review of Systems   Constitutional:  Negative for activity change, fatigue and unexpected weight change.   Respiratory:  Positive for cough and shortness of breath. Negative for chest tightness.    Cardiovascular:  Negative for chest pain and leg swelling.   Gastrointestinal:  Negative for abdominal pain, blood in stool, constipation and diarrhea.   Genitourinary:  Negative for pelvic pain and vaginal bleeding.   Musculoskeletal:  Negative for back pain.   Neurological:  Negative for dizziness, light-headedness and headaches.   Psychiatric/Behavioral:  Negative for dysphoric mood. The patient is not nervous/anxious.        Objective   /85 (BP Location: Left arm, Patient Position: Sitting)   Pulse 52   Temp 36.6 °C (97.9 °F)   Ht 1.6 m (5' 3\")   Wt 78 kg (172 lb)   BMI 30.47 kg/m²     Physical Exam  Vitals reviewed.   Constitutional:       General: She is not in acute distress.     Appearance: Normal appearance. She is not ill-appearing.   HENT:      Head: Normocephalic and " atraumatic.      Right Ear: Tympanic membrane and ear canal normal.      Left Ear: Tympanic membrane and ear canal normal.      Nose: Nose normal. No congestion or rhinorrhea.      Mouth/Throat:      Mouth: Mucous membranes are moist.      Pharynx: Oropharynx is clear.   Eyes:      Extraocular Movements: Extraocular movements intact.      Conjunctiva/sclera: Conjunctivae normal.      Pupils: Pupils are equal, round, and reactive to light.   Cardiovascular:      Rate and Rhythm: Normal rate and regular rhythm.      Pulses: Normal pulses.      Heart sounds: Normal heart sounds. No murmur heard.  Pulmonary:      Effort: Pulmonary effort is normal. No respiratory distress.      Breath sounds: Normal breath sounds.   Chest:   Breasts:     Right: Normal. No mass, nipple discharge or skin change.      Left: Normal. No mass, nipple discharge or skin change.   Abdominal:      General: Abdomen is flat. Bowel sounds are normal.      Palpations: Abdomen is soft.      Tenderness: There is no abdominal tenderness.   Genitourinary:     General: Normal vulva.      Vagina: No bleeding.      Cervix: Normal.      Uterus: Normal.       Adnexa: Right adnexa normal and left adnexa normal.      Comments: Mild atrophy of the vaginal mucosa  Musculoskeletal:         General: Normal range of motion.      Cervical back: Normal range of motion and neck supple.   Lymphadenopathy:      Cervical: No cervical adenopathy.      Upper Body:      Right upper body: No axillary adenopathy.      Left upper body: No axillary adenopathy.   Skin:     General: Skin is warm and dry.   Neurological:      General: No focal deficit present.      Mental Status: She is alert and oriented to person, place, and time.   Psychiatric:         Mood and Affect: Mood normal.         Thought Content: Thought content normal.     Lab Results   Component Value Date    WBC 3.3 (L) 06/30/2025    HGB 14.2 06/30/2025    HCT 44.3 06/30/2025     06/30/2025    CHOL 222 (H)  06/30/2025    TRIG 135 06/30/2025    HDL 70 06/30/2025    ALT 19 06/30/2025    AST 19 06/30/2025     06/30/2025    K 4.1 06/30/2025     06/30/2025    CREATININE 0.90 06/30/2025    BUN 14 06/30/2025    CO2 30 06/30/2025     par    Assessment/Plan   Assessment & Plan  Adult general medical exam  Continue healthy food choices, monitor portions, increase physical activity as tolerated.  Return in fall for seasonal flu vaccine       Cervical cancer screening  Pap smear updated today  Orders:    THINPREP PAP TEST    HPV DNA High Risk With Genotype    Screening mammogram for breast cancer  Recommend annual mammogram  Orders:    BI mammo bilateral screening tomosynthesis; Future    Asymptomatic menopause  Proceed with bone density for assessment of osteoporosis risk given menopausal state and reported family history of osteoporosis.  Orders:    XR DEXA bone density; Future    Screening for viral disease    Orders:    Hepatitis C Antibody; Future    Second hand tobacco smoke exposure  Obtain chest x-ray given patient's intermittent symptoms of shortness of breath.  If symptoms are persistent, discussed potential need for pulmonary function testing  Orders:    XR chest 2 views; Future    Essential hypertension  Continue lisinopril-HCTZ at current dose  Orders:    lisinopriL-hydrochlorothiazide 10-12.5 mg tablet; Take 1 tablet by mouth once daily.    Basic metabolic panel; Future    Dry skin dermatitis    Orders:    triamcinolone (Kenalog) 0.1 % cream; Apply topically 2 times a day as needed for irritation. Apply to affected area 1-2 times daily as needed.    Leukopenia, unspecified type  Recent lab work notable for mild leukopenia.  Will repeat CBC with follow-up labs.  Orders:    CBC and Auto Differential; Future    SOB (shortness of breath)    Orders:    XR chest 2 views; Future

## 2025-07-15 ASSESSMENT — ENCOUNTER SYMPTOMS
COUGH: 1
UNEXPECTED WEIGHT CHANGE: 0
BACK PAIN: 0
DYSPHORIC MOOD: 0
CONSTIPATION: 0
ACTIVITY CHANGE: 0
BLOOD IN STOOL: 0
DIZZINESS: 0
ABDOMINAL PAIN: 0
CHEST TIGHTNESS: 0
NERVOUS/ANXIOUS: 0
FATIGUE: 0
SHORTNESS OF BREATH: 1
HEADACHES: 0
DIARRHEA: 0
LIGHT-HEADEDNESS: 0

## 2025-07-15 NOTE — ASSESSMENT & PLAN NOTE
Continue lisinopril-HCTZ at current dose  Orders:    lisinopriL-hydrochlorothiazide 10-12.5 mg tablet; Take 1 tablet by mouth once daily.    Basic metabolic panel; Future

## 2025-07-23 ENCOUNTER — HOSPITAL ENCOUNTER (OUTPATIENT)
Dept: RADIOLOGY | Facility: CLINIC | Age: 65
Discharge: HOME | End: 2025-07-23
Payer: COMMERCIAL

## 2025-07-23 ENCOUNTER — RESULTS FOLLOW-UP (OUTPATIENT)
Dept: PRIMARY CARE | Facility: CLINIC | Age: 65
End: 2025-07-23
Payer: COMMERCIAL

## 2025-07-23 DIAGNOSIS — R06.02 SOB (SHORTNESS OF BREATH): ICD-10-CM

## 2025-07-23 DIAGNOSIS — Z77.22 SECOND HAND TOBACCO SMOKE EXPOSURE: ICD-10-CM

## 2025-07-23 DIAGNOSIS — Z12.31 SCREENING MAMMOGRAM FOR BREAST CANCER: ICD-10-CM

## 2025-07-23 DIAGNOSIS — Z78.0 ASYMPTOMATIC MENOPAUSE: ICD-10-CM

## 2025-07-23 PROCEDURE — 77063 BREAST TOMOSYNTHESIS BI: CPT | Performed by: RADIOLOGY

## 2025-07-23 PROCEDURE — 77067 SCR MAMMO BI INCL CAD: CPT | Performed by: RADIOLOGY

## 2025-07-23 PROCEDURE — 71046 X-RAY EXAM CHEST 2 VIEWS: CPT | Performed by: RADIOLOGY

## 2025-07-23 PROCEDURE — 77067 SCR MAMMO BI INCL CAD: CPT

## 2025-07-23 PROCEDURE — 71046 X-RAY EXAM CHEST 2 VIEWS: CPT

## 2025-07-23 PROCEDURE — 77080 DXA BONE DENSITY AXIAL: CPT

## 2025-07-23 PROCEDURE — 77080 DXA BONE DENSITY AXIAL: CPT | Performed by: RADIOLOGY

## 2026-01-14 ENCOUNTER — APPOINTMENT (OUTPATIENT)
Dept: PRIMARY CARE | Facility: CLINIC | Age: 66
End: 2026-01-14
Payer: COMMERCIAL